# Patient Record
Sex: FEMALE | Race: WHITE
[De-identification: names, ages, dates, MRNs, and addresses within clinical notes are randomized per-mention and may not be internally consistent; named-entity substitution may affect disease eponyms.]

---

## 2019-09-22 ENCOUNTER — HOSPITAL ENCOUNTER (EMERGENCY)
Dept: HOSPITAL 25 - ED | Age: 49
Discharge: HOME | End: 2019-09-22
Payer: COMMERCIAL

## 2019-09-22 VITALS — SYSTOLIC BLOOD PRESSURE: 122 MMHG | DIASTOLIC BLOOD PRESSURE: 88 MMHG
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
## 2019-09-22 DIAGNOSIS — S61.411A: Primary | ICD-10-CM
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
## 2019-09-22 DIAGNOSIS — Z23: ICD-10-CM

## 2019-09-22 PROCEDURE — 90715 TDAP VACCINE 7 YRS/> IM: CPT

## 2019-09-22 PROCEDURE — 99282 EMERGENCY DEPT VISIT SF MDM: CPT

## 2019-09-22 PROCEDURE — 90471 IMMUNIZATION ADMIN: CPT

## 2019-09-22 PROCEDURE — 12002 RPR S/N/AX/GEN/TRNK2.6-7.5CM: CPT

## 2019-09-22 NOTE — ED
Laceration/Wound HPI





- HPI Summary


HPI Summary: 


49-year-old female presents with right hand laceration today.  She cut it on a 

bread knife.  She .  No numbness or tingling.  Does have full range of motion 

of hand.  She is left-handed.  Works as an .  denies any foreign 

body in the wound.  Unsure when last tetanus was.  





- History of Current Complaint


Stated Complaint: RIGHT ARM LACERATION PER PT


Time Seen by Provider: 09/22/19 17:43


Pain Intensity: 6





- Allergy/Home Medications


Allergies/Adverse Reactions: 


 Allergies











Allergy/AdvReac Type Severity Reaction Status Date / Time


 


No Known Allergies Allergy   Verified 09/22/19 15:19














PMH/Surg Hx/FS Hx/Imm Hx


Endocrine/Hematology History: 


   Denies: Hx Anticoagulant Therapy


Respiratory History: 


   Denies: Hx Asthma


Infectious Disease History: No


Infectious Disease History: 


   Denies: Traveled Outside the  in Last 30 Days





- Family History


Known Family History: Positive: Non-Contributory





- Social History


Alcohol Use: Occasionally


Substance Use Type: Reports: None


Smoking Status (MU): Never Smoked Tobacco





Review of Systems


Negative: Fever


Negative: Chest Pain


Negative: Shortness Of Breath


Positive: Other - right hand laceration


All Other Systems Reviewed And Are Negative: Yes





Physical Exam


Triage Information Reviewed: Yes


Vital Signs On Initial Exam: 


 Initial Vitals











Temp Pulse Resp BP Pulse Ox


 


 99.6 F   82   12   121/80   99 


 


 09/22/19 15:14  09/22/19 15:14  09/22/19 15:14  09/22/19 15:14  09/22/19 15:14











Vital Signs Reviewed: Yes


Appearance: Positive: Well-Appearing


Skin: Positive: Warm, Dry, Other - 4cm by 1cm laceration to palm of right hand


Head/Face: Positive: Normal Head/Face Inspection


Eyes: Positive: Normal, Conjunctiva Clear


ENT: Positive: Pharynx normal


Respiratory/Lung Sounds: Positive: Clear to Auscultation, Breath Sounds Present


Cardiovascular: Positive: Normal, RRR


Musculoskeletal: Positive: Strength/ROM Intact - right hand, Other - capillary 

refill<2 secs


Neurological: Positive: Normal


Psychiatric: Positive: Normal





Procedures





- Laceration/Wound Repair


  ** 1


Location: Other - right hand laceration


Description: Linear


Anesthesia: Local, 1.0%


Length, Depth and Shape: 4cm by 1cm laceration to palm of right hand


Irrigated w/ Saline (ccs): 500


Laceration/Wound Explored: no foreign body removed


Closure: Single Layer


Suture Type: Prolene


Number of Sutures: 5


Sterile Dressing Applied?: No - telfa and ace





Diagnostics





- Vital Signs


 Vital Signs











  Temp Pulse Resp BP Pulse Ox


 


 09/22/19 15:14  99.6 F  82  12  121/80  99














- Laboratory


Lab Statement: Any lab studies that have been ordered have been reviewed, and 

results considered in the medical decision making process.





Laceration Repair Course/Dx





- Course


Course Of Treatment: 49-year-old female presents with right hand laceration 

today.  She cut it on a bread knife.  She .  No numbness or tingling.  Does 

have full range of motion of hand.  She is left-handed.  Works as an 

.  denies any foreign body in the wound.  Unsure when last tetanus 

was.  On exam has 4 cm by 1/2cm laceration right palm.  Cleaned area and placed 

5 sutures.  Told to keep area clean dry.  Patient understands agrees plan.





- Differential Dx


Differental Diagnoses: Abrasion, Avulsion, Laceration





- Clinical Impression


Provider Diagnoses: 


 Laceration of right hand








Discharge ED





- Sign-Out/Discharge


Documenting (check all that apply): Patient Departure


Patient Received Moderate/Deep Sedation with Procedure: No





- Discharge Plan


Condition: Good


Disposition: HOME


Patient Education Materials:  Care For Your Stitches (ED)


Referrals: 


Lorraine Del Castillo MD [Medical Doctor] - 


Additional Instructions: 


Take Tylenol or ibuprofen for pain every 6 hours as needed


Keep area clean and dry for 24 hours


Return to ED or primary in 8-10 days to have sutures removed 


Return to ED if develop signs of infection such as fever, spreading redness, or 

pus.





- Billing Disposition and Condition


Condition: GOOD


Disposition: Home